# Patient Record
Sex: FEMALE | Race: ASIAN
[De-identification: names, ages, dates, MRNs, and addresses within clinical notes are randomized per-mention and may not be internally consistent; named-entity substitution may affect disease eponyms.]

---

## 2020-06-17 ENCOUNTER — APPOINTMENT (OUTPATIENT)
Dept: ORTHOPEDIC SURGERY | Facility: CLINIC | Age: 50
End: 2020-06-17
Payer: COMMERCIAL

## 2020-06-17 DIAGNOSIS — R29.898 OTHER SYMPTOMS AND SIGNS INVOLVING THE MUSCULOSKELETAL SYSTEM: ICD-10-CM

## 2020-06-17 DIAGNOSIS — M16.10 UNILATERAL PRIMARY OSTEOARTHRITIS, UNSPECIFIED HIP: ICD-10-CM

## 2020-06-17 DIAGNOSIS — Z78.9 OTHER SPECIFIED HEALTH STATUS: ICD-10-CM

## 2020-06-17 PROCEDURE — 73502 X-RAY EXAM HIP UNI 2-3 VIEWS: CPT | Mod: LT

## 2020-06-17 PROCEDURE — 99204 OFFICE O/P NEW MOD 45 MIN: CPT

## 2020-06-17 NOTE — HISTORY OF PRESENT ILLNESS
[de-identified] : 49 year old patient presents complaining of left leg weakness. 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 The surgery was done in [place]. She notes that this is the only hip operation that she has had. She notes that since then, her right thigh is stronger and larger than her left thigh. She notes that her left hip marina and gives out. She does not complain of significant pain. She has occasional discomfort after exercise but is more concerned with the leg size and strength discrepancy. She does walk on a treadmill but feels that she is favoring her left leg as it is weaker. She cannot run or ride an outdoor bike. She does not have pain at night when she sleeps. She has not been treated for this in the past. The patient denies taking any medication to manage her pain. She denies DM, hypertension or any medication allergies.  She does feel that her legs are uneven and is very concerned about the weakness in the leg.

## 2020-06-17 NOTE — DISCUSSION/SUMMARY
[de-identified] : I discussed the underlying pathophysiology of the patient's condition in great detail with the patient. I went over the patient's x-rays with them in great detail. The extent of the patient’s arthritis was discussed in great detail with them. We discussed the use of ice, Tylenol and anti-inflammatories to relieve pain. The patient was instructed in ROM exercises they are to do at home. At-home strengthening, and stretching exercises were discussed and demonstrated with the patient. We went over the wide ranging benefits of exercise for the patient health and I encouraged her to begin a diligent exercise program. She needs to avoid high-impact activities such as running and jumping. I recommend alternate activities such as yoga, pilates, barre classes, toning classes, and riding a stationary bike on low tension. She should avoid squatting, and kneeling. I discussed that the patient should get a heel lift insert for her left shoe. A prescription for orthotics was provided to the patient. At this time, she will start a course of physical therapy for strengthening and flexibility. A prescription for physical therapy was provided. All of her questions were answered. She understands and consents to the plan.  She was told at some point she might need a hip replacement but that the hardware would have to come out prior to the replacement.  She understands the decrease loading of the side will preserve her natural joint\par \par FU 4-6 weeks.\par with the heel lift inserts.\par after undergoing PT for her left hip.

## 2020-06-17 NOTE — ADDENDUM
[FreeTextEntry1] : I, Kelvin Suh, acted solely as a scribe for Dr. Akira Marrero on this date 06/17/2020.\par All medical record entries made by the Scribe were at my, Dr. Akira Marrero, direction and personally dictated by me on 06/17/2020. I have reviewed the chart and agree that the record accurately reflects my personal performance of the history, physical exam, assessment and plan. I have also personally directed, reviewed, and agreed with the chart.

## 2020-06-17 NOTE — PHYSICAL EXAM
[de-identified] : Constitutional\par o Appearance : well-nourished, well developed, alert, in no acute distress \par Head and Face\par o Head :\par ¦ Inspection : atraumatic, normocephalic\par o Face :\par ¦ Inspection : no visible rash or discoloration\par Respiratory\par o Respiratory Effort: breathing unlabored \par Neurologic\par o Mental Status Examination :\par ¦ Orientation : alert and oriented X 3\par Psychiatric\par o Mood and Affect: mood normal, affect appropriate\par Cardiovascular\par o Observation/Palpation : - no swelling\par Lymphatic\par o Additional Nodes : No palpable lymph nodes present\par \par Lumbosacral Spine\par o Inspection : no visible rash or discoloration\par o Palpation : paraspinal musculature nontender\par o Range of Motion : full and painless arc of motion in all planes\par o Muscle Strength : paraspinal muscle strength and tone within normal limits\par o Muscle Tone : paraspinal muscle strength and tone within normal limits\par Tests: straight leg test negative\par  \par Right Lower Extremity\par o Buttock : no tenderness, swelling or deformities\par o Right Hip :\par ¦ Inspection/Palpation : no tenderness, swelling or deformities\par ¦ Range of Motion : full and painless in all planes, no crepitance\par ¦ Stability : joint stability intact\par ¦ Strength : extension, flexion, adduction, abduction, internal rotation and external rotation 5/5 \par \par Left Lower Extremity\par o Buttock : no tenderness, swelling or deformities \par o Left Hip :\par ¦ Inspection/Palpation : mild greater trochanteric and IT band tenderness,  noswelling , atrophy of the left thigh\par ¦ Range of Motion : significant internal rotation limitation, range of motion is painless, no crepitance\par ¦ Stability : joint stability intact\par ¦ Strength : extension, flexion, adduction, abduction 4-/5, internal rotation and external rotation 4+/5\par  \par Gait: abductor lurch to the left, no significant extremity swelling or lymphedema, atrophy of the left thigh and calf , 1/2 inch shortening on the left compared to the right- the shortening is in the thigh\par \par Radiology Results \par Pelvis: AP pelvis and lateral left hip were obtained and revealed severe degenerative arthritis of the left hip with hardware present including a hip compression screw and multiple screws with broken trochanteric wiring

## 2020-07-16 ENCOUNTER — APPOINTMENT (OUTPATIENT)
Dept: ORTHOPEDIC SURGERY | Facility: CLINIC | Age: 50
End: 2020-07-16